# Patient Record
Sex: FEMALE | Race: WHITE | Employment: STUDENT | URBAN - METROPOLITAN AREA
[De-identification: names, ages, dates, MRNs, and addresses within clinical notes are randomized per-mention and may not be internally consistent; named-entity substitution may affect disease eponyms.]

---

## 2017-01-20 ENCOUNTER — OFFICE VISIT (OUTPATIENT)
Dept: CARDIOLOGY CLINIC | Age: 22
End: 2017-01-20

## 2017-01-20 VITALS
DIASTOLIC BLOOD PRESSURE: 65 MMHG | BODY MASS INDEX: 19.16 KG/M2 | OXYGEN SATURATION: 98 % | SYSTOLIC BLOOD PRESSURE: 104 MMHG | HEART RATE: 95 BPM | HEIGHT: 65 IN | WEIGHT: 115 LBS

## 2017-01-20 DIAGNOSIS — R07.89 CHEST DISCOMFORT: ICD-10-CM

## 2017-01-20 DIAGNOSIS — R00.2 PALPITATIONS: Primary | ICD-10-CM

## 2017-01-29 NOTE — PROGRESS NOTES
Subjective:      Kavita Guerra is in the office today for cardiac reevaluation. She is a 19-year-old woman that was seen on 11/07 for further evaluation of a several month history of palpitations. She described them as being really bad.   She mostly noticed them when she was lying down. She said that she felt as if her heart was beating out of my chest.  Generally, the episodes would last three minutes at a time. She had a little associated dizziness, but no near syncope or syncope. She also experienced stabbing chest pain. It was not well localized, but was generally in the left precordium and usually brief in duration. There does not appear to be any particular inciting factors. She had a Holter monitor done that was benign. A stress test was done on 12/22. She exercised adequately and had no ischemic changes. In the office today, she says she is feeling better. She is not having much chest discomfort or palpitations. She attributes that to discontinuing a stressful situation at school. Patient Active Problem List    Diagnosis Date Noted    Palpitations 11/19/2016    Chest discomfort 11/19/2016    Migraine with aura and without status migrainosus, not intractable 09/22/2016     Current Outpatient Prescriptions   Medication Sig Dispense Refill    rizatriptan (MAXALT-MLT) 10 mg disintegrating tablet Take 1 tablet at first sign of headache, may repeat dose in 2 hours if needed, do not exceed two tablets in 24 hours 9 Tab 11    butalbital-acetaminophen-caffeine (FIORICET, ESGIC) -40 mg per tablet Take 1 Tab by mouth every six (6) hours as needed for Pain. Max Daily Amount: 4 Tabs. 15 Tab 5    norethindrone-ethinyl estradiol (JUNEL FE 1/20) 1 mg-20 mcg (21)/75 mg (7) tab Take  by mouth.        Allergies   Allergen Reactions    Levaquin [Levofloxacin] Shortness of Breath     Past Medical History   Diagnosis Date    Migraine      No past surgical history on file. Family History   Problem Relation Age of Onset    Cancer Father      skin cancer    Depression Maternal Grandmother     Heart Disease Neg Hx     Hypertension Neg Hx     Diabetes Neg Hx      History   Smoking Status    Never Smoker   Smokeless Tobacco    Not on file          Review of Systems, additional:  Constitutional: negative  Eyes: negative  Respiratory: negative  Cardiovascular: positive for palpitations  Gastrointestinal: negative  Musculoskeletal:negative  Neurological: negative  Behvioral/Psych: negative  Endocrine: negative  ENT: negative    Objective:     Visit Vitals    /65    Pulse 95    Ht 5' 5\" (1.651 m)    Wt 52.2 kg (115 lb)    SpO2 98%    BMI 19.14 kg/m2     General:  alert, cooperative, no distress   Chest Wall: inspection normal - no chest wall deformities or tenderness, respiratory effort normal   Lung: clear to auscultation bilaterally   Heart:  normal rate and regular rhythm, S1 and S2 normal, no murmurs noted, no gallops noted   Abdomen: soft, non-tender. Bowel sounds normal. No masses,  no organomegaly   Extremities: extremities normal, atraumatic, no cyanosis or edema Skin: no rashes   Neuro: alert, oriented, normal speech, no focal findings or movement disorder noted         Assessment/Plan:       ICD-10-CM ICD-9-CM    1. Palpitations, Normal holter done 12/2/2016 R00.2 785.1    2. Chest discomfort, TMST done 12/22/2016 adequate and negative for ischemia. No further cardiac evaluation planned.  R07.89 786.59

## 2017-07-07 ENCOUNTER — OFFICE VISIT (OUTPATIENT)
Dept: FAMILY MEDICINE CLINIC | Age: 22
End: 2017-07-07

## 2017-07-07 VITALS
WEIGHT: 115 LBS | TEMPERATURE: 98.4 F | DIASTOLIC BLOOD PRESSURE: 82 MMHG | BODY MASS INDEX: 19.16 KG/M2 | SYSTOLIC BLOOD PRESSURE: 110 MMHG | HEIGHT: 65 IN | HEART RATE: 83 BPM | OXYGEN SATURATION: 98 % | RESPIRATION RATE: 16 BRPM

## 2017-07-07 DIAGNOSIS — Z02.0 SCHOOL PHYSICAL EXAM: Primary | ICD-10-CM

## 2017-07-07 DIAGNOSIS — Z11.1 TUBERCULOSIS SCREENING: ICD-10-CM

## 2017-07-07 NOTE — PROGRESS NOTES
Cail Restrepo is a 20 y.o. female here for paperwork       1. Have you been to the ER, urgent care clinic or hospitalized since your last visit? NO.     2. Have you seen or consulted any other health care providers outside of the [address] [place] [address] since your last visit (Include any pap smears or colon screening)? NO      Do you have an Advanced Directive? NO    Would you like information on Advanced Directives?  NO

## 2017-07-07 NOTE — MR AVS SNAPSHOT
Visit Information Date & Time Provider Department Dept. Phone Encounter #  
 7/7/2017  9:30 AM Sade Rodriguez, 3 [facility] [phone] [id number] Upcoming Health Maintenance Date Due  
 HPV AGE 9Y-34Y (1 of 3 - Female 3 Dose Series) 6/28/2005 DTaP/Tdap/Td series (1 - Tdap) 6/28/2015 PAP AKA CERVICAL CYTOLOGY 6/28/2015 INFLUENZA AGE 9 TO ADULT 8/1/2017 Allergies as of 7/7/2017  Review Complete On: 7/7/2017 By: Sade Rodriguez MD  
  
 Severity Noted Reaction Type Reactions Levaquin [Levofloxacin]  04/19/2016    Shortness of Breath Current Immunizations  Never Reviewed Name Date DTaP 7/2/1999, 10/5/1995, 1994, 1994, 1994 HPV 3/30/2013, 10/7/2011, 10/25/2010 Hep B Vaccine 1994, 1994, 1994 Hib 10/5/1995, 1994, 1994, 1994 Influenza Nasal Vaccine 10/25/2010 Influenza Vaccine 10/7/2011 TB Skin Test (PPD) Intradermal  Incomplete, 5/3/2016, 4/19/2016 Tdap 8/17/2010 Varicella Virus Vaccine 5/17/2016, 4/21/2016 Not reviewed this visit You Were Diagnosed With   
  
 Codes Comments School physical exam    -  Primary ICD-10-CM: Z02.0 ICD-9-CM: V70.5 Tuberculosis screening     ICD-10-CM: Z11.1 ICD-9-CM: V74.1 Vitals BP Pulse Temp Resp Height(growth percentile) Weight(growth percentile) 110/82 (BP 1 Location: Left arm, BP Patient Position: Sitting) 83 98.4 °F (36.9 °C) (Oral) 16 5' 5\" (1.651 m) 115 lb (52.2 kg) SpO2 BMI OB Status Smoking Status 98% 19.14 kg/m2 Unknown Never Smoker BMI and BSA Data Body Mass Index Body Surface Area  
 19.14 kg/m 2 1.55 m 2 Preferred Pharmacy Pharmacy Name Phone [facility] [address] [place] [address] [place] [phone] Your Updated Medication List  
  
   
This list is accurate as of: 7/7/17  9:31 AM.  Always use your most recent med list.  
  
  
  
  
 butalbital-acetaminophen-caffeine -40 mg per tablet Commonly known as:  Gwynda Duet Take 1 Tab by mouth every six (6) hours as needed for Pain. Max Daily Amount: 4 Tabs. norethindrone-ethinyl estradiol 1 mg-20 mcg (21)/75 mg (7) Tab Commonly known as:  Reather Numbers FE 1/20 Take  by mouth.  
  
 rizatriptan 10 mg disintegrating tablet Commonly known as:  MAXALT-MLT Take 1 tablet at first sign of headache, may repeat dose in 2 hours if needed, do not exceed two tablets in 24 hours We Performed the Following AMB POC TUBERCULOSIS, INTRADERMAL (SKIN TEST) [85673 CPT(R)] Patient Instructions Return for PPD reading in 72 hours Introducing [facility] & HEALTH SERVICES! [facility] introduces [employer] patient portal. Now you can access parts of your medical record, email your doctor's office, and request medication refills online. 1. In your internet browser, go to [email or web address] 2. Click on the First Time User? Click Here link in the Sign In box. You will see the New Member Sign Up page. 3. Enter your [employer] Access Code exactly as it appears below. You will not need to use this code after youve completed the sign-up process. If you do not sign up before the expiration date, you must request a new code. · [employer] Access Code: [email or web address] Expires: 10/5/2017  9:11 AM 
 
4. Enter the last four digits of your Social Security Number (xxxx) and Date of Birth (mm/dd/yyyy) as indicated and click Submit. You will be taken to the next sign-up page. 5. Create a [employer] ID. This will be your [employer] login ID and cannot be changed, so think of one that is secure and easy to remember. 6. Create a [employer] password. You can change your password at any time. 7. Enter your Password Reset Question and Answer. This can be used at a later time if you forget your password. 8. Enter your e-mail address.  You will receive e-mail notification when new information is available in [employer]. 9. Click Sign Up. You can now view and download portions of your medical record. 10. Click the Download Summary menu link to download a portable copy of your medical information. If you have questions, please visit the Frequently Asked Questions section of the [employer] website. Remember, [employer] is NOT to be used for urgent needs. For medical emergencies, dial 911. Now available from your iPhone and Android! Please provide this summary of care documentation to your next provider. Your primary care clinician is listed as Mustapha Barahona. If you have any questions after today's visit, please call [phone].

## 2017-07-07 NOTE — PROGRESS NOTES
HISTORY OF PRESENT ILLNESS  Tiffanie Larios is a 20 y.o. female. Other   The history is provided by the patient and medical records. Associated symptoms include headaches (migraine, less often). Pertinent negatives include no chest pain, no abdominal pain and no shortness of breath. Review of Systems   Constitutional: Negative for fever and weight loss. HENT: Negative for hearing loss. Eyes:        Corrective lenses   Respiratory: Negative for cough and shortness of breath. Cardiovascular: Negative for chest pain and palpitations. Gastrointestinal: Negative for abdominal pain, diarrhea, heartburn, nausea and vomiting. Musculoskeletal: Negative for joint pain and myalgias. Neurological: Positive for headaches (migraine, less often). Negative for dizziness, tingling, speech change and focal weakness. Psychiatric/Behavioral: Negative for depression and suicidal ideas. The patient is not nervous/anxious. Visit Vitals    /82 (BP 1 Location: Left arm, BP Patient Position: Sitting)    Pulse 83    Temp 98.4 °F (36.9 °C) (Oral)    Resp 16    Ht 5' 5\" (1.651 m)    Wt 115 lb (52.2 kg)    SpO2 98%    BMI 19.14 kg/m2     Physical Exam   Constitutional: She is oriented to person, place, and time. She appears well-developed and well-nourished. HENT:   Head: Normocephalic. Right Ear: Tympanic membrane and ear canal normal.   Left Ear: Tympanic membrane and ear canal normal.   Mouth/Throat: Oropharynx is clear and moist.   Eyes: Conjunctivae and EOM are normal.   Neck: Neck supple. Cardiovascular: Normal rate, regular rhythm and normal heart sounds. Pulmonary/Chest: Effort normal and breath sounds normal.   Abdominal: Soft. Bowel sounds are normal. There is no tenderness. Lymphadenopathy:     She has no cervical adenopathy. Neurological: She is alert and oriented to person, place, and time. Skin: Skin is warm and dry. Psychiatric: She has a normal mood and affect.  Her behavior is normal.   Nursing note and vitals reviewed. ASSESSMENT and PLAN    ICD-10-CM ICD-9-CM    1. School physical exam Z02.0 V70.5    2.  Tuberculosis screening Z11.1 V74.1 AMB POC TUBERCULOSIS, INTRADERMAL (SKIN TEST)   Return for PPD reading in 72 hours

## 2017-07-10 ENCOUNTER — CLINICAL SUPPORT (OUTPATIENT)
Dept: FAMILY MEDICINE CLINIC | Age: 22
End: 2017-07-10

## 2017-07-10 DIAGNOSIS — Z11.1 ENCOUNTER FOR PPD SKIN TEST READING: Primary | ICD-10-CM

## 2017-07-10 LAB
MM INDURATION POC: 0 MM (ref 0–5)
PPD POC: NEGATIVE NEGATIVE

## 2017-07-10 NOTE — PROGRESS NOTES
PPD Reading Note  PPD read and results entered in [facility] [employer]. Result: 0 mm induration.   Interpretation: negative  If test not read within 48-72 hours of initial placement, patient advised to repeat in other arm 1-3 weeks after this test.  Allergic reaction: no

## 2017-07-11 ENCOUNTER — TELEPHONE (OUTPATIENT)
Dept: FAMILY MEDICINE CLINIC | Age: 22
End: 2017-07-11

## 2017-07-12 NOTE — TELEPHONE ENCOUNTER
Noted.    Angely Bonilla - can you go ahead and put her on the schedule for a PPD so we know to expect her on Friday?

## 2017-07-14 ENCOUNTER — CLINICAL SUPPORT (OUTPATIENT)
Dept: FAMILY MEDICINE CLINIC | Age: 22
End: 2017-07-14

## 2017-07-14 DIAGNOSIS — Z11.1 SCREENING-PULMONARY TB: Primary | ICD-10-CM

## 2017-07-14 NOTE — PROGRESS NOTES
Eboni Ortiz is a 20 y.o. female is here for nurse visit ppd placement 2nd in her two step. PPD Placement note  Eboni Ortiz, 20 y.o. female is here today for placement of PPD test  Reason for PPD test: school  Pt taken PPD test before: yes  Verified in allergy area and with patient that they are not allergic to the products PPD is made of (Phenol or Tween). Yes  Is patient taking any oral or IV steroid medication now or have they taken it in the last month? no  Has the patient ever received the BCG vaccine?: no  Has the patient been in recent contact with anyone known or suspected of having active TB disease?: no       Date of exposure (if applicable): na       Name of person they were exposed to (if applicable): na  Patient's Country of origin?: [place]  O: Alert and oriented in NAD. P:  PPD placed on 7/14/2017. To right forearm Patient advised to return for reading within 48-72 hours.

## 2017-07-17 ENCOUNTER — CLINICAL SUPPORT (OUTPATIENT)
Dept: FAMILY MEDICINE CLINIC | Age: 22
End: 2017-07-17

## 2017-07-17 DIAGNOSIS — Z11.1 ENCOUNTER FOR PPD SKIN TEST READING: Primary | ICD-10-CM

## 2017-07-17 LAB
MM INDURATION POC: 0 MM (ref 0–5)
PPD POC: NEGATIVE NEGATIVE

## 2017-11-22 DIAGNOSIS — G43.109 MIGRAINE WITH AURA AND WITHOUT STATUS MIGRAINOSUS, NOT INTRACTABLE: ICD-10-CM

## 2017-11-22 RX ORDER — RIZATRIPTAN BENZOATE 10 MG/1
TABLET, ORALLY DISINTEGRATING ORAL
Qty: 27 TAB | Refills: 3 | Status: SHIPPED | OUTPATIENT
Start: 2017-11-22 | End: 2018-11-29 | Stop reason: SDUPTHER

## 2018-07-02 ENCOUNTER — TELEPHONE (OUTPATIENT)
Dept: FAMILY MEDICINE CLINIC | Age: 23
End: 2018-07-02

## 2018-07-02 DIAGNOSIS — Z13.228 SCREENING FOR METABOLIC DISORDER: ICD-10-CM

## 2018-07-02 DIAGNOSIS — Z13.1 SCREENING FOR DIABETES MELLITUS: ICD-10-CM

## 2018-07-02 DIAGNOSIS — Z13.89 SCREENING FOR BLOOD OR PROTEIN IN URINE: ICD-10-CM

## 2018-07-02 DIAGNOSIS — Z13.29 SCREENING FOR THYROID DISORDER: ICD-10-CM

## 2018-07-02 DIAGNOSIS — Z13.0 SCREENING, ANEMIA, DEFICIENCY, IRON: Primary | ICD-10-CM

## 2018-07-02 DIAGNOSIS — Z00.00 ROUTINE GENERAL MEDICAL EXAMINATION AT A HEALTH CARE FACILITY: ICD-10-CM

## 2018-07-02 DIAGNOSIS — Z13.220 SCREENING, LIPID: ICD-10-CM

## 2018-07-02 NOTE — TELEPHONE ENCOUNTER
Patient has a CPE and is needing to have labs ordered    Future Appointments  Date Time Provider Radha Artis   7/10/2018 9:00 AM Margaret Ayala MD [facility]

## 2018-07-04 LAB
A-G RATIO,AGRAT: 1.9 RATIO (ref 1.1–2.6)
ABSOLUTE LYMPHOCYTE COUNT, 10803: 1.5 K/UL (ref 1–4.8)
ALBUMIN SERPL-MCNC: 4.8 G/DL (ref 3.5–5)
ALP SERPL-CCNC: 63 U/L (ref 25–115)
ALT SERPL-CCNC: 8 U/L (ref 5–40)
ANION GAP SERPL CALC-SCNC: 18 MMOL/L
AST SERPL W P-5'-P-CCNC: 14 U/L (ref 10–37)
AVG GLU, 10930: 90 MG/DL (ref 91–123)
BASOPHILS # BLD: 0 K/UL (ref 0–0.2)
BASOPHILS NFR BLD: 1 % (ref 0–2)
BILIRUB SERPL-MCNC: 0.4 MG/DL (ref 0.2–1.2)
BUN SERPL-MCNC: 8 MG/DL (ref 6–22)
CALCIUM SERPL-MCNC: 9.6 MG/DL (ref 8.4–10.5)
CHLORIDE SERPL-SCNC: 98 MMOL/L (ref 98–110)
CHOLEST SERPL-MCNC: 189 MG/DL (ref 110–200)
CO2 SERPL-SCNC: 24 MMOL/L (ref 20–32)
CREAT SERPL-MCNC: 0.6 MG/DL (ref 0.5–1.2)
EOSINOPHIL # BLD: 0.2 K/UL (ref 0–0.5)
EOSINOPHIL NFR BLD: 5 % (ref 0–6)
ERYTHROCYTE [DISTWIDTH] IN BLOOD BY AUTOMATED COUNT: 13.6 % (ref 10–15.5)
GFRAA, 66117: >60
GFRNA, 66118: >60
GLOBULIN,GLOB: 2.5 G/DL (ref 2–4)
GLUCOSE SERPL-MCNC: 95 MG/DL (ref 70–99)
GRANULOCYTES,GRANS: 62 % (ref 40–75)
HBA1C MFR BLD HPLC: 4.8 % (ref 4.8–5.9)
HCT VFR BLD AUTO: 45.3 % (ref 35.1–46.5)
HDLC SERPL-MCNC: 4.1 MG/DL (ref 0–5)
HDLC SERPL-MCNC: 46 MG/DL (ref 40–59)
HGB BLD-MCNC: 14.4 G/DL (ref 11.7–15.5)
LDLC SERPL CALC-MCNC: 112 MG/DL (ref 50–99)
LYMPHOCYTES, LYMLT: 29 % (ref 20–45)
MCH RBC QN AUTO: 30 PG (ref 26–34)
MCHC RBC AUTO-ENTMCNC: 32 G/DL (ref 31–36)
MCV RBC AUTO: 95 FL (ref 80–95)
MONOCYTES # BLD: 0.2 K/UL (ref 0.1–1)
MONOCYTES NFR BLD: 4 % (ref 3–12)
NEUTROPHILS # BLD AUTO: 3.2 K/UL (ref 1.8–7.7)
PLATELET # BLD AUTO: 289 K/UL (ref 140–440)
PMV BLD AUTO: 10.6 FL (ref 9–13)
POTASSIUM SERPL-SCNC: 3.9 MMOL/L (ref 3.5–5.5)
PROT SERPL-MCNC: 7.3 G/DL (ref 6.4–8.3)
RBC # BLD AUTO: 4.78 M/UL (ref 3.8–5.2)
SODIUM SERPL-SCNC: 140 MMOL/L (ref 133–145)
TRIGL SERPL-MCNC: 152 MG/DL (ref 40–149)
TSH SERPL DL<=0.005 MIU/L-ACNC: 1.83 MCU/ML (ref 0.27–4.2)
VLDLC SERPL CALC-MCNC: 30 MG/DL (ref 8–30)
WBC # BLD AUTO: 5.1 K/UL (ref 4–11)

## 2018-07-10 ENCOUNTER — OFFICE VISIT (OUTPATIENT)
Dept: FAMILY MEDICINE CLINIC | Age: 23
End: 2018-07-10

## 2018-07-10 VITALS
SYSTOLIC BLOOD PRESSURE: 110 MMHG | OXYGEN SATURATION: 98 % | RESPIRATION RATE: 18 BRPM | DIASTOLIC BLOOD PRESSURE: 72 MMHG | WEIGHT: 114 LBS | HEART RATE: 87 BPM | TEMPERATURE: 98 F | HEIGHT: 65 IN | BODY MASS INDEX: 18.99 KG/M2

## 2018-07-10 DIAGNOSIS — Z11.1 SCREENING FOR TUBERCULOSIS: ICD-10-CM

## 2018-07-10 DIAGNOSIS — Z00.00 ROUTINE GENERAL MEDICAL EXAMINATION AT A HEALTH CARE FACILITY: Primary | ICD-10-CM

## 2018-07-10 NOTE — PROGRESS NOTES
Mindi Tucker is a 24 y.o. female here for physical       Mindi Tucker is a 24 y.o. female (: 1995) presenting to address:    Chief Complaint   Patient presents with    Physical     here for physical        Vitals:    07/10/18 0855   BP: 110/72   Pulse: 87   Resp: 18   SpO2: 98%   Weight: 114 lb (51.7 kg)   Height: 5' 5\" (1.651 m)   PainSc:   0 - No pain       Hearing/Vision:   No exam data present    Learning Assessment:     Learning Assessment 2016   PRIMARY LEARNER Patient   HIGHEST LEVEL OF EDUCATION - PRIMARY LEARNER  SOME COLLEGE   BARRIERS PRIMARY LEARNER NONE   CO-LEARNER CAREGIVER No   PRIMARY LANGUAGE ENGLISH   LEARNER PREFERENCE PRIMARY DEMONSTRATION   ANSWERED BY patient   RELATIONSHIP SELF     Depression Screening:     PHQ over the last two weeks 7/10/2018   Little interest or pleasure in doing things Not at all   Feeling down, depressed or hopeless Not at all   Total Score PHQ 2 0     Fall Risk Assessment:   No flowsheet data found. Abuse Screening:   No flowsheet data found. Coordination of Care Questionaire:   1. Have you been to the ER, urgent care clinic since your last visit? Hospitalized since your last visit? NO    2. Have you seen or consulted any other health care providers outside of the [address] [place] [address] since your last visit? Include any pap smears or colon screening. NO    Advanced Directive:   1. Do you have an Advanced Directive? NO    2. Would you like information on Advanced Directives?  NO

## 2018-07-10 NOTE — MR AVS SNAPSHOT
[address] [place] [address]  [address] 0590 [facility] [id number] [phone] Patient: Jada Garcia MRN: [id number] :1995 Visit Information Date & Time Provider Department Dept. Phone Encounter #  
 7/10/2018  9:00 AM Luis Alberto Young, [facility] [phone] [id number] Upcoming Health Maintenance Date Due  
 PAP AKA CERVICAL CYTOLOGY 2015 Influenza Age 5 to Adult 2018 DTaP/Tdap/Td series (7 - Td) 2020 Allergies as of 7/10/2018  Review Complete On: 7/10/2018 By: Luis Alberto Young MD  
  
 Severity Noted Reaction Type Reactions Levaquin [Levofloxacin]  2016    Shortness of Breath Current Immunizations  Reviewed on 2017 Name Date DTaP 1999, 10/5/1995, 1994, 1994, 1994 HPV 3/30/2013, 10/7/2011, 10/25/2010 Hep B Vaccine 1994, 1994, 1994 Hib 10/5/1995, 1994, 1994, 1994 Influenza Nasal Vaccine 10/25/2010 Influenza Vaccine 10/7/2011 TB Skin Test (PPD) Intradermal  Incomplete, 2017 11:05 AM, 2017, 5/3/2016, 2016 Tdap 2010 Varicella Virus Vaccine 2016, 2016 Not reviewed this visit You Were Diagnosed With   
  
 Codes Comments Routine general medical examination at a health care facility    -  Primary ICD-10-CM: Z00.00 ICD-9-CM: V70.0 Screening for tuberculosis     ICD-10-CM: Z11.1 ICD-9-CM: V74.1 Vitals BP Pulse Temp Resp Height(growth percentile) Weight(growth percentile) 110/72 (BP 1 Location: Left arm, BP Patient Position: Sitting) 87 98 °F (36.7 °C) (Oral) 18 5' 5\" (1.651 m) 114 lb (51.7 kg) SpO2 BMI OB Status Smoking Status 98% 18.97 kg/m2 Unknown Never Smoker Vitals History BMI and BSA Data Body Mass Index Body Surface Area  
 18.97 kg/m 2 1.54 m 2 Preferred Pharmacy Pharmacy Name Phone [facility] [place] [address] [phone] Your Updated Medication List  
  
   
This list is accurate as of 7/10/18  9:14 AM.  Always use your most recent med list.  
  
  
  
  
 norethindrone-ethinyl estradiol 1 mg-20 mcg (21)/75 mg (7) Tab Commonly known as:  Julita Cyrus FE 1/20 Take  by mouth.  
  
 rizatriptan 10 mg disintegrating tablet Commonly known as:  MAXALT-MLT Dissolve 1 tablet in mouth at first sign of headache, may repeat dose in 2 hours if needed, do not exceed two tablets in 24 hours We Performed the Following AMB POC TUBERCULOSIS, INTRADERMAL (SKIN TEST) [19462 CPT(R)] Patient Instructions Anticipatory guidance and recommendations provided verbally and with printed information. Return for annual physical in 1 year, sooner with any problems. Return with nurse for PPD reading in 48-72 hours Well Visit, Ages 25 to 48: Care Instructions Your Care Instructions Physical exams can help you stay healthy. Your doctor has checked your overall health and may have suggested ways to take good care of yourself. He or she also may have recommended tests. At home, you can help prevent illness with healthy eating, regular exercise, and other steps. Follow-up care is a key part of your treatment and safety. Be sure to make and go to all appointments, and call your doctor if you are having problems. It's also a good idea to know your test results and keep a list of the medicines you take. How can you care for yourself at home? · Reach and stay at a healthy weight. This will lower your risk for many problems, such as obesity, diabetes, heart disease, and high blood pressure. · Get at least 30 minutes of physical activity on most days of the week. Walking is a good choice. You also may want to do other activities, such as running, swimming, cycling, or playing tennis or team sports.  Discuss any changes in your exercise program with your doctor. · Do not smoke or allow others to smoke around you. If you need help quitting, talk to your doctor about stop-smoking programs and medicines. These can increase your chances of quitting for good. · Talk to your doctor about whether you have any risk factors for sexually transmitted infections (STIs). Having one sex partner (who does not have STIs and does not have sex with anyone else) is a good way to avoid these infections. · Use birth control if you do not want to have children at this time. Talk with your doctor about the choices available and what might be best for you. · Protect your skin from too much sun. When you're outdoors from 10 a.m. to 4 p.m., stay in the shade or cover up with clothing and a hat with a wide brim. Wear sunglasses that block UV rays. Even when it's cloudy, put broad-spectrum sunscreen (SPF 30 or higher) on any exposed skin. · See a dentist one or two times a year for checkups and to have your teeth cleaned. · Wear a seat belt in the car. · Drink alcohol in moderation, if at all. That means no more than 2 drinks a day for men and 1 drink a day for women. Follow your doctor's advice about when to have certain tests. These tests can spot problems early. For everyone · Cholesterol. Have the fat (cholesterol) in your blood tested after age 21. Your doctor will tell you how often to have this done based on your age, family history, or other things that can increase your risk for heart disease. · Blood pressure. Have your blood pressure checked during a routine doctor visit. Your doctor will tell you how often to check your blood pressure based on your age, your blood pressure results, and other factors. · Vision. Talk with your doctor about how often to have a glaucoma test. 
· Diabetes. Ask your doctor whether you should have tests for diabetes. · Colon cancer. Have a test for colon cancer at age 48.  You may have one of several tests. If you are younger than 48, you may need a test earlier if you have any risk factors. Risk factors include whether you already had a precancerous polyp removed from your colon or whether your parent, brother, sister, or child has had colon cancer. For women · Breast exam and mammogram. Talk to your doctor about when you should have a clinical breast exam and a mammogram. Medical experts differ on whether and how often women under 50 should have these tests. Your doctor can help you decide what is right for you. · Pap test and pelvic exam. Begin Pap tests at age 24. A Pap test is the best way to find cervical cancer. The test often is part of a pelvic exam. Ask how often to have this test. 
· Tests for sexually transmitted infections (STIs). Ask whether you should have tests for STIs. You may be at risk if you have sex with more than one person, especially if your partners do not wear condoms. For men · Tests for sexually transmitted infections (STIs). Ask whether you should have tests for STIs. You may be at risk if you have sex with more than one person, especially if you do not wear a condom. · Testicular cancer exam. Ask your doctor whether you should check your testicles regularly. · Prostate exam. Talk to your doctor about whether you should have a blood test (called a PSA test) for prostate cancer. Experts differ on whether and when men should have this test. Some experts suggest it if you are older than 39 and are -American or have a father or brother who got prostate cancer when he was younger than 72. When should you call for help? Watch closely for changes in your health, and be sure to contact your doctor if you have any problems or symptoms that concern you. Where can you learn more? Go to http://jose-last.info/. Enter P072 in the search box to learn more about \"Well Visit, Ages 25 to 48: Care Instructions. \" Current as of: May 12, 2017 Content Version: 11.4 © 4909-0488 Healthwise, Incorporated. Care instructions adapted under license by [employer] (which disclaims liability or warranty for this information). If you have questions about a medical condition or this instruction, always ask your healthcare professional. [address] any warranty or liability for your use of this information. Introducing \[facility]\"" & HEALTH SERVICES! [employer] introduces [email or web address] patient portal. Now you can access parts of your medical record, email your doctor's office, and request medication refills online. 1. In your internet browser, go to [email or web address] 2. Click on the First Time User? Click Here link in the Sign In box. You will see the New Member Sign Up page. 3. Enter your [email or web address] Access Code exactly as it appears below. You will not need to use this code after youve completed the sign-up process. If you do not sign up before the expiration date, you must request a new code. · [email or web address] Access Code: [email or web address] Expires: 10/8/2018  9:14 AM 
 
4. Enter the last four digits of your Social Security Number (xxxx) and Date of Birth (mm/dd/yyyy) as indicated and click Submit. You will be taken to the next sign-up page. 5. Create a [email or web address] ID. This will be your [email or web address] login ID and cannot be changed, so think of one that is secure and easy to remember. 6. Create a [email or web address] password. You can change your password at any time. 7. Enter your Password Reset Question and Answer. This can be used at a later time if you forget your password. 8. Enter your e-mail address. You will receive e-mail notification when new information is available in [address] 9. Click Sign Up. You can now view and download portions of your medical record. 10. Click the Download Summary menu link to download a portable copy of your medical information. If you have questions, please visit the Frequently Asked Questions section of the [employer] website. Remember, [employer] is NOT to be used for urgent needs. For medical emergencies, dial 911. Now available from your iPhone and Android! Please provide this summary of care documentation to your next provider. Your primary care clinician is listed as Linda Gonsalez. If you have any questions after today's visit, please call [phone].

## 2018-07-10 NOTE — PATIENT INSTRUCTIONS
Anticipatory guidance and recommendations provided verbally and with printed information. Return for annual physical in 1 year, sooner with any problems. Return with nurse for PPD reading in 48-72 hours Well Visit, Ages 25 to 48: Care Instructions Your Care Instructions Physical exams can help you stay healthy. Your doctor has checked your overall health and may have suggested ways to take good care of yourself. He or she also may have recommended tests. At home, you can help prevent illness with healthy eating, regular exercise, and other steps. Follow-up care is a key part of your treatment and safety. Be sure to make and go to all appointments, and call your doctor if you are having problems. It's also a good idea to know your test results and keep a list of the medicines you take. How can you care for yourself at home? · Reach and stay at a healthy weight. This will lower your risk for many problems, such as obesity, diabetes, heart disease, and high blood pressure. · Get at least 30 minutes of physical activity on most days of the week. Walking is a good choice. You also may want to do other activities, such as running, swimming, cycling, or playing tennis or team sports. Discuss any changes in your exercise program with your doctor. · Do not smoke or allow others to smoke around you. If you need help quitting, talk to your doctor about stop-smoking programs and medicines. These can increase your chances of quitting for good. · Talk to your doctor about whether you have any risk factors for sexually transmitted infections (STIs). Having one sex partner (who does not have STIs and does not have sex with anyone else) is a good way to avoid these infections. · Use birth control if you do not want to have children at this time. Talk with your doctor about the choices available and what might be best for you. · Protect your skin from too much sun.  When you're outdoors from 10 a.m. to 4 p.m., stay in the shade or cover up with clothing and a hat with a wide brim. Wear sunglasses that block UV rays. Even when it's cloudy, put broad-spectrum sunscreen (SPF 30 or higher) on any exposed skin. · See a dentist one or two times a year for checkups and to have your teeth cleaned. · Wear a seat belt in the car. · Drink alcohol in moderation, if at all. That means no more than 2 drinks a day for men and 1 drink a day for women. Follow your doctor's advice about when to have certain tests. These tests can spot problems early. For everyone · Cholesterol. Have the fat (cholesterol) in your blood tested after age [address]. Your doctor will tell you how often to have this done based on your age, family history, or other things that can increase your risk for heart disease. · Blood pressure. Have your blood pressure checked during a routine doctor visit. Your doctor will tell you how often to check your blood pressure based on your age, your blood pressure results, and other factors. · Vision. Talk with your doctor about how often to have a glaucoma test. 
· Diabetes. Ask your doctor whether you should have tests for diabetes. · Colon cancer. Have a test for colon cancer at age 48. You may have one of several tests. If you are younger than 48, you may need a test earlier if you have any risk factors. Risk factors include whether you already had a precancerous polyp removed from your colon or whether your parent, brother, sister, or child has had colon cancer. For women · Breast exam and mammogram. Talk to your doctor about when you should have a clinical breast exam and a mammogram. Medical experts differ on whether and how often women under 50 should have these tests. Your doctor can help you decide what is right for you. · Pap test and pelvic exam. Begin Pap tests at age 24. A Pap test is the best way to find cervical cancer.  The test often is part of a pelvic exam. Ask how often to have this test. 
· Tests for sexually transmitted infections (STIs). Ask whether you should have tests for STIs. You may be at risk if you have sex with more than one person, especially if your partners do not wear condoms. For men · Tests for sexually transmitted infections (STIs). Ask whether you should have tests for STIs. You may be at risk if you have sex with more than one person, especially if you do not wear a condom. · Testicular cancer exam. Ask your doctor whether you should check your testicles regularly. · Prostate exam. Talk to your doctor about whether you should have a blood test (called a PSA test) for prostate cancer. Experts differ on whether and when men should have this test. Some experts suggest it if you are older than 39 and are -American or have a father or brother who got prostate cancer when he was younger than 72. When should you call for help? Watch closely for changes in your health, and be sure to contact your doctor if you have any problems or symptoms that concern you. Where can you learn more? Go to http://jose-last.info/. Enter P072 in the search box to learn more about \"Well Visit, Ages 25 to 48: Care Instructions. \" Current as of: May 12, 2017 Content Version: 11.4 © 6908-0298 [employer]. Care instructions adapted under license by [employer] (which disclaims liability or warranty for this information). If you have questions about a medical condition or this instruction, always ask your healthcare professional. [place] [address] any warranty or liability for your use of this information.

## 2018-07-10 NOTE — PROGRESS NOTES
HISTORY OF PRESENT ILLNESS  Avi Parks is a 24 y.o. female. Physical   The history is provided by the patient and medical records. Associated symptoms include headaches (migraine headaches about every 2-3 weeks, respond to rizatriptan). Pertinent negatives include no chest pain, no abdominal pain and no shortness of breath. Past Medical History:   Diagnosis Date    Migraine      History reviewed. No pertinent surgical history. Family History   Problem Relation Age of Onset    Cancer Father      skin cancer    Depression Maternal Grandmother     Heart Disease Neg Hx     Hypertension Neg Hx     Diabetes Neg Hx      History   Smoking Status    Never Smoker   Smokeless Tobacco    Never Used     History   Alcohol Use    Yes     Comment: social drinker     Health Maintenance Review:  Immunization History   Administered Date(s) Administered    DTaP 1994, 1994, 1994, 10/05/1995, 07/02/1999    HPV 10/25/2010, 10/07/2011, 03/30/2013    Hep B Vaccine 1994, 1994, 1994    Hib 1994, 1994, 1994, 10/05/1995    Influenza Nasal Vaccine 10/25/2010    Influenza Vaccine 10/07/2011    TB Skin Test (PPD) Intradermal 04/19/2016, 05/03/2016, 07/07/2017, 07/14/2017    Tdap 08/17/2010    Varicella Virus Vaccine 04/21/2016, 05/17/2016     Pap smear - 1/2018, normal      Review of Systems   Constitutional: Negative for chills, fever and weight loss. HENT: Negative for hearing loss. Eyes: Negative for blurred vision and double vision. Wears corrective lenses   Respiratory: Negative for cough, shortness of breath and wheezing. Cardiovascular: Positive for palpitations (not noticable). Negative for chest pain and leg swelling. Gastrointestinal: Negative for abdominal pain, blood in stool, constipation, diarrhea, heartburn, melena, nausea and vomiting. Genitourinary: Negative for dysuria and urgency.    Musculoskeletal: Negative for joint pain and myalgias. Skin: Positive for rash. Negative for itching. Episodic bumps, arms, legs, abdomen   Neurological: Positive for headaches (migraine headaches about every 2-3 weeks, respond to rizatriptan). Negative for dizziness, tingling, sensory change and focal weakness. Endo/Heme/Allergies: Positive for environmental allergies (seasonal). Psychiatric/Behavioral: Negative for depression. The patient is not nervous/anxious and does not have insomnia. Visit Vitals    /72 (BP 1 Location: Left arm, BP Patient Position: Sitting)    Pulse 87    Temp 98 °F (36.7 °C) (Oral)    Resp 18    Ht 5' 5\" (1.651 m)    Wt 114 lb (51.7 kg)    SpO2 98%    BMI 18.97 kg/m2       Physical Exam   Constitutional: She is oriented to person, place, and time. She appears well-developed and well-nourished. HENT:   Head: Normocephalic. Right Ear: Tympanic membrane and ear canal normal.   Left Ear: Tympanic membrane and ear canal normal.   Mouth/Throat: Oropharynx is clear and moist.   Eyes: Conjunctivae and EOM are normal. Pupils are equal, round, and reactive to light. Neck: Neck supple. Cardiovascular: Normal rate, regular rhythm, normal heart sounds and intact distal pulses. Pulmonary/Chest: Effort normal and breath sounds normal.   Abdominal: Soft. Bowel sounds are normal. She exhibits no mass. There is no tenderness. Musculoskeletal: She exhibits no edema. Neurological: She is alert and oriented to person, place, and time. She has normal reflexes. Skin: Skin is warm and dry. Non de script small superficial skin lesions right arm, right thigh    Psychiatric: She has a normal mood and affect. Her behavior is normal.   Nursing note and vitals reviewed. ASSESSMENT and PLAN    ICD-10-CM ICD-9-CM    1. Routine general medical examination at a health care facility Z00.00 V70.0    2.  Screening for tuberculosis Z11.1 V74.1 AMB POC TUBERCULOSIS, INTRADERMAL (SKIN TEST)   Anticipatory guidance and recommendations provided verbally and with printed information. Return for annual physical in 1 year, sooner with any problems.

## 2018-07-12 ENCOUNTER — CLINICAL SUPPORT (OUTPATIENT)
Dept: FAMILY MEDICINE CLINIC | Age: 23
End: 2018-07-12

## 2018-07-12 DIAGNOSIS — Z11.1 ENCOUNTER FOR PPD SKIN TEST READING: Primary | ICD-10-CM

## 2018-07-12 LAB
MM INDURATION POC: 0 MM (ref 0–5)
PPD POC: NEGATIVE NEGATIVE

## 2018-07-12 NOTE — PROGRESS NOTES
PPD Reading Note  PPD read and results entered in [facility]. Result: 0 mm induration.   Interpretation: negative  If test not read within 48-72 hours of initial placement, patient advised to repeat in other arm 1-3 weeks after this test.  Allergic reaction: no

## 2018-07-17 ENCOUNTER — CLINICAL SUPPORT (OUTPATIENT)
Dept: FAMILY MEDICINE CLINIC | Age: 23
End: 2018-07-17

## 2018-07-17 DIAGNOSIS — Z11.1 TUBERCULOSIS SCREENING: Primary | ICD-10-CM

## 2018-07-17 NOTE — PROGRESS NOTES
PPD Placement note  [facility], 24 y.o. female is here today for placement of PPD test  Reason for PPD test: tuberculosis screening  Pt taken PPD test before: yes  Verified in allergy area and with patient that they are not allergic to the products PPD is made of (Phenol or Tween). Yes  Is patient taking any oral or IV steroid medication now or have they taken it in the last month? no  Has the patient ever received the BCG vaccine?: no  Has the patient been in recent contact with anyone known or suspected of having active TB disease?: no       Date of exposure (if applicable): n/a       Name of person they were exposed to (if applicable): n/a  Patient's Country of origin?: n/a  O: Alert and oriented in NAD. P:  PPD placed on 7/17/2018. Patient advised to return for reading within 48-72 hours.

## 2018-07-19 ENCOUNTER — CLINICAL SUPPORT (OUTPATIENT)
Dept: FAMILY MEDICINE CLINIC | Age: 23
End: 2018-07-19

## 2018-07-19 DIAGNOSIS — Z11.1 ENCOUNTER FOR PPD SKIN TEST READING: Primary | ICD-10-CM

## 2018-07-19 LAB
MM INDURATION POC: 0 MM (ref 0–5)
PPD POC: NEGATIVE NEGATIVE

## 2018-07-19 NOTE — PROGRESS NOTES
PPD Reading Note  PPD read and results entered in [facility]. Result: 0 mm induration.   Interpretation: negative  If test not read within 48-72 hours of initial placement, patient advised to repeat in other arm 1-3 weeks after this test.  Allergic reaction: no

## 2018-11-29 DIAGNOSIS — G43.109 MIGRAINE WITH AURA AND WITHOUT STATUS MIGRAINOSUS, NOT INTRACTABLE: ICD-10-CM

## 2018-11-29 RX ORDER — RIZATRIPTAN BENZOATE 10 MG/1
TABLET, ORALLY DISINTEGRATING ORAL
Qty: 12 TAB | Refills: 8 | Status: SHIPPED | OUTPATIENT
Start: 2018-11-29 | End: 2019-08-26 | Stop reason: SDUPTHER

## 2019-08-26 DIAGNOSIS — G43.109 MIGRAINE WITH AURA AND WITHOUT STATUS MIGRAINOSUS, NOT INTRACTABLE: ICD-10-CM

## 2019-08-26 RX ORDER — RIZATRIPTAN BENZOATE 10 MG/1
TABLET, ORALLY DISINTEGRATING ORAL
Qty: 12 TAB | Refills: 8 | Status: SHIPPED | OUTPATIENT
Start: 2019-08-26

## 2019-08-26 NOTE — TELEPHONE ENCOUNTER
Patient calling to request refill on: Maxalt      Remaining pills:0  Last appt:Thursday, July 19, 2018  Next appt:    Pharmacy:[facility]       Patient aware of 72 hour time frame. Requested Prescriptions     Pending Prescriptions Disp Refills    rizatriptan (MAXALT-MLT) 10 mg disintegrating tablet 12 Tab 8     Sig: DISSOLVE 1 TABLET IN MOUTH AT FIRST SIGN OF HEADACHE. MAY REPEAT DOSE IN 2 HOURS IF NEEDED.  DO NOT EXCEED 2 TABLETS IN 24 HOURS